# Patient Record
Sex: FEMALE | Race: WHITE | URBAN - METROPOLITAN AREA
[De-identification: names, ages, dates, MRNs, and addresses within clinical notes are randomized per-mention and may not be internally consistent; named-entity substitution may affect disease eponyms.]

---

## 2017-12-14 ENCOUNTER — OFFICE VISIT (OUTPATIENT)
Dept: INTERNAL MEDICINE | Facility: CLINIC | Age: 22
End: 2017-12-14
Payer: COMMERCIAL

## 2017-12-14 VITALS
HEIGHT: 70 IN | WEIGHT: 253.4 LBS | TEMPERATURE: 99.3 F | DIASTOLIC BLOOD PRESSURE: 60 MMHG | OXYGEN SATURATION: 98 % | BODY MASS INDEX: 36.28 KG/M2 | SYSTOLIC BLOOD PRESSURE: 118 MMHG | HEART RATE: 71 BPM

## 2017-12-14 DIAGNOSIS — Z30.41 ENCOUNTER FOR BIRTH CONTROL PILLS MAINTENANCE: ICD-10-CM

## 2017-12-14 DIAGNOSIS — Z11.3 SCREENING EXAMINATION FOR VENEREAL DISEASE: ICD-10-CM

## 2017-12-14 DIAGNOSIS — Z00.01 ENCOUNTER FOR ROUTINE ADULT MEDICAL EXAM WITH ABNORMAL FINDINGS: Primary | ICD-10-CM

## 2017-12-14 DIAGNOSIS — F41.8 DEPRESSION WITH ANXIETY: ICD-10-CM

## 2017-12-14 PROCEDURE — 99385 PREV VISIT NEW AGE 18-39: CPT | Performed by: INTERNAL MEDICINE

## 2017-12-14 PROCEDURE — 99213 OFFICE O/P EST LOW 20 MIN: CPT | Mod: 25 | Performed by: INTERNAL MEDICINE

## 2017-12-14 PROCEDURE — 87491 CHLMYD TRACH DNA AMP PROBE: CPT | Performed by: INTERNAL MEDICINE

## 2017-12-14 PROCEDURE — 87591 N.GONORRHOEAE DNA AMP PROB: CPT | Performed by: INTERNAL MEDICINE

## 2017-12-14 RX ORDER — BUPROPION HYDROCHLORIDE 150 MG/1
150 TABLET ORAL DAILY
Qty: 90 TABLET | Refills: 0 | Status: SHIPPED | OUTPATIENT
Start: 2017-12-14 | End: 2019-03-25

## 2017-12-14 RX ORDER — ESCITALOPRAM OXALATE 20 MG/1
20 TABLET ORAL DAILY
Qty: 90 TABLET | Refills: 0 | Status: SHIPPED | OUTPATIENT
Start: 2017-12-14 | End: 2019-03-25

## 2017-12-14 RX ORDER — ESCITALOPRAM OXALATE 10 MG/1
10 TABLET ORAL DAILY
COMMUNITY
Start: 2017-12-02 | End: 2017-12-14

## 2017-12-14 RX ORDER — DROSPIRENONE AND ETHINYL ESTRADIOL TABLETS 0.02-3(28)
1 KIT ORAL DAILY
Qty: 84 TABLET | Refills: 3 | Status: SHIPPED | OUTPATIENT
Start: 2017-12-14 | End: 2019-03-25

## 2017-12-14 RX ORDER — BUPROPION HYDROCHLORIDE 150 MG/1
150 TABLET ORAL DAILY
COMMUNITY
Start: 2017-12-02 | End: 2017-12-14

## 2017-12-14 RX ORDER — DROSPIRENONE AND ETHINYL ESTRADIOL TABLETS 0.02-3(28)
1 KIT ORAL DAILY
Refills: 0 | COMMUNITY
Start: 2017-10-30 | End: 2017-12-14

## 2017-12-14 ASSESSMENT — PATIENT HEALTH QUESTIONNAIRE - PHQ9
SUM OF ALL RESPONSES TO PHQ QUESTIONS 1-9: 13
5. POOR APPETITE OR OVEREATING: SEVERAL DAYS

## 2017-12-14 ASSESSMENT — ANXIETY QUESTIONNAIRES
2. NOT BEING ABLE TO STOP OR CONTROL WORRYING: SEVERAL DAYS
1. FEELING NERVOUS, ANXIOUS, OR ON EDGE: MORE THAN HALF THE DAYS
3. WORRYING TOO MUCH ABOUT DIFFERENT THINGS: MORE THAN HALF THE DAYS
6. BECOMING EASILY ANNOYED OR IRRITABLE: NOT AT ALL
5. BEING SO RESTLESS THAT IT IS HARD TO SIT STILL: SEVERAL DAYS

## 2017-12-14 NOTE — PATIENT INSTRUCTIONS
Urine sample today.    ---    Refills of birth control and Wellbutrin sent to pharmacy.    ---    Increase dose of Lexapro to 20mg daily.    ---    Follow-up for mood in ~6 weeks (earlier as needed).

## 2017-12-14 NOTE — PROGRESS NOTES
SUBJECTIVE:                                                      HPI: Tuyet Rodriguez is a pleasant 22 year old female who presents for a physical.    She reports that her depression and anxiety are not well controlled on her current medications.    She is on Lexapro 10mg daily and Wellbutrin XL 150mg daily.     She sees her therapist weekly.    PHQ-9 score 13/17 and MOISÉS-7 score 8/21 indicating continued mild-moderate symptoms.     Patient is open to adjusting medications. Her medications were originally prescribed by a psychiatry PA who has not adjusted them since prescribing them.     She also requests refills of OCP.     ROS:  Constitutional: denies unintentional weight loss or gain; denies fevers, chills, or sweats     Cardiovascular: denies chest pain, palpitations, or edema  Respiratory: denies cough, wheezing, shortness of breath, or dyspnea on exertion  Gastrointestinal: denies nausea, vomiting, constipation, diarrhea, or abdominal pain  Genitourinary: denies urinary frequency, urgency, dysuria, or hematuria  Integumentary: denies rash or pruritus  Musculoskeletal: denies back pain, muscle pain, joint pain, or joint swelling  Neurologic: denies focal weakness, numbness, or tingling  Hematologic/Immunologic: denies history of anemia or blood transfusions  Endocrine: denies heat or cold intolerance; denies polyuria, polydipsia  Psychiatric: see above    Past Medical History:   Diagnosis Date     Anxiety      Depression      Obesity (BMI 30-39.9)      Past Surgical History:   Procedure Laterality Date     TONSILLECTOMY & ADENOIDECTOMY  2003     Family History   Problem Relation Age of Onset     Hypertension Mother      Breast Cancer Mother      post-menopausal     Hypertension Father      Hyperlipidemia Father      Type 2 Diabetes Maternal Aunt      Hypertension Maternal Aunt      Myocardial Infarction Paternal Uncle      s/p CABG; later in life     CEREBROVASCULAR DISEASE No family hx of      Coronary  "Artery Disease Early Onset No family hx of      Ovarian Cancer No family hx of      Colon Cancer No family hx of      Occupational History     [employer]           [employer]      [employer]     Social History Main Topics     Smoking status: Never Smoker     Smokeless tobacco: Never Used     Alcohol use Yes      Comment: 2 drinks/month     Drug use: No     Sexual activity: Yes     Partners: Male     Birth control/ protection: Pill     Social History Narrative    Single.    Lives at home with family.    Active jobs; infrequent exercise.      Allergies   Allergen Reactions     Seasonal Allergies      Current Outpatient Prescriptions   Medication Sig     LORYNA 3-0.02 MG per tablet Take 1 tablet by mouth daily     buPROPion (WELLBUTRIN XL) 150 MG 24 hr tablet Take 1 tablet (150 mg) by mouth daily     escitalopram (LEXAPRO) 10 MG tablet Take 1 tablet (10 mg) by mouth daily     Immunization History   Administered Date(s) Administered     HPV Quadrivalent 08/03/2009, 03/06/2010, 08/10/2010     TDAP Vaccine (Adacel) 08/03/2009     Varicella 12/26/2007     OBJECTIVE:                                                      /60 (BP Location: Left arm, Patient Position: Chair, Cuff Size: Adult Large)  Pulse 71  Temp 99.3  F (37.4  C) (Oral)  Ht 5' 10\" (1.778 m)  Wt 253 lb 6.4 oz (114.9 kg)  LMP 12/14/2017 (Exact Date)  SpO2 98%  BMI 36.36 kg/m2  Constitutional: well-appearing  Eyes: normal conjunctivae and lids; pupils equal, round, and reactive to light  Ears, Nose, Mouth, and Throat: normal ears and nose; tympanic membranes visualized and normal; normal lips, teeth, and gums; no oropharyngeal lesions or ulcers  Neck: supple and symmetric; no lymphadenopathy; no thyromegaly or masses  Respiratory: normal respiratory effort; clear to auscultation bilaterally  Cardiovascular: regular rate and rhythm; pedal pulses palpable; no edema  Gastrointestinal: soft, non-tender, " non-distended, and bowel sounds present; no organomegaly or masses  Musculoskeletal: normal gait and station  Psych: normal judgment and insight; normal mood and affect; recent and remote memory intact; oriented to time, place, and person    PREVENTATIVE HEALTH                                                      Blood pressure: within normal limits   Breast CA screening: not medically indicated at this time   Cervical CA screening: not medically indicated at this time   Colon CA screening: not medically indicated at this time   Lung CA screening: n/a   Dexa: not medically indicated at this time   Screening HCV: n/a   Screening cholesterol: not medically indicated at this time   Screening diabetes: not medically indicated at this time   STD testing: screening for gonorrhea and chlamydia DUE  Alcohol misuse screening: alcohol use reviewed - no intervention indicated at this time  Immunizations: reviewed; flu shot DUE - patient declines    ASSESSMENT/PLAN:                                                       (Z00.01) Encounter for routine adult medical exam with abnormal findings  (primary encounter diagnosis)  Comment: PMH, PSH, FH, SH, medications, allergies, immunizations, and preventative health measures reviewed.   Plan: see below for plans.     (Z11.3) Screening examination for venereal disease  Comment: asymptomatic.   Plan: urine for GC/C today.     (Z30.41) Encounter for birth control pills maintenance  Plan: OCP refilled.     (F41.8) Depression with anxiety  Comment: suboptimally controlled on current regimen.   Plan:    - CONTINUE Wellbutrin XL 150mg daily.   - INCREASE Lexapro to 20mg daily.   - follow-up in ~6 weeks (earlier as needed).   - can always consider increasing Wellbutrin as well.    The instructions on the AVS were discussed and explained to the patient. Patient expressed understanding of instructions.    (Chart documentation was completed, in part, with [employer] voice-recognition software. Even  though reviewed, some grammatical, spelling, and word errors may remain.)    Adilene Daly MD   [address] [address]  T: [phone], F: [phone]

## 2017-12-14 NOTE — NURSING NOTE
"Chief Complaint   Patient presents with     Refill Request     Physical       Initial /60 (BP Location: Left arm, Patient Position: Chair, Cuff Size: Adult Large)  Pulse 71  Temp 99.3  F (37.4  C) (Oral)  Ht 5' 10\" (1.778 m)  Wt 253 lb 6.4 oz (114.9 kg)  LMP 12/14/2017 (Exact Date)  SpO2 98%  BMI 36.36 kg/m2 Estimated body mass index is 36.36 kg/(m^2) as calculated from the following:    Height as of this encounter: 5' 10\" (1.778 m).    Weight as of this encounter: 253 lb 6.4 oz (114.9 kg).  Medication Reconciliation: complete     Kaminibose MA      "

## 2017-12-14 NOTE — MR AVS SNAPSHOT
"              After Visit Summary   12/14/2017    Tuyet Rodriguez    MRN: [id number]           Patient Information     Date Of Birth          1995        Visit Information        Provider Department      12/14/2017 1:00 PM Adilene Daly MD [facility]        Today's Diagnoses     Screening examination for venereal disease    -  1    Encounter for birth control pills maintenance        Depression with anxiety          Care Instructions    Urine sample today.    ---    Refills of birth control and Wellbutrin sent to pharmacy.    ---    Increase dose of Lexapro to 20mg daily.    ---    Follow-up for mood in ~6 weeks (earlier as needed).           Follow-ups after your visit        Who to contact     If you have questions or need follow up information about today's clinic visit or your schedule please contact [facility] directly at [phone].  Normal or non-critical lab and imaging results will be communicated to you by MyChart, letter or phone within 4 business days after the clinic has received the results. If you do not hear from us within 7 days, please contact the clinic through MyChart or phone. If you have a critical or abnormal lab result, we will notify you by phone as soon as possible.  Submit refill requests through [email or web address] or call your pharmacy and they will forward the refill request to us. Please allow 3 business days for your refill to be completed.          Additional Information About Your Visit        MyChart Information     [email or web address] lets you send messages to your doctor, view your test results, renew your prescriptions, schedule appointments and more. To sign up, go to [email or web address] . Click on \"Log in\" on the left side of the screen, which will take you to the Welcome page. Then click on \"Sign up Now\" on the right side of the page.     You will be asked to enter the access code listed below, as well as some personal information. " "Please follow the directions to create your username and password.     Your access code is: [email or web address]  Expires: 2018  3:37 PM     Your access code will  in 90 days. If you need help or a new code, please call your [place] clinic or [phone].        Care EveryWhere ID     This is your Care EveryWhere ID. This could be used by other organizations to access your [place] medical records  [id number]        Your Vitals Were     Pulse Temperature Height Last Period Pulse Oximetry BMI (Body Mass Index)    71 99.3  F (37.4  C) (Oral) 5' 10\" (1.778 m) 2017 (Exact Date) 98% 36.36 kg/m2       Blood Pressure from Last 3 Encounters:   17 118/60    Weight from Last 3 Encounters:   17 253 lb 6.4 oz (114.9 kg)              We Performed the Following     CHLAMYDIA TRACHOMATIS PCR     NEISSERIA GONORRHOEA PCR          Today's Medication Changes          These changes are accurate as of: 17  1:41 PM.  If you have any questions, ask your nurse or doctor.               Start taking these medicines.        Dose/Directions    escitalopram 20 MG tablet   Commonly known as:  LEXAPRO   Used for:  Depression with anxiety   Started by:  Adilene Daly MD        Dose:  20 mg   Take 1 tablet (20 mg) by mouth daily   Quantity:  90 tablet   Refills:  0            Where to get your medicines      These medications were sent to [facility] [address] [facility] - [place], [place] - [address]  [address] [facility] [address]     Phone:  [phone]     buPROPion 150 MG 24 hr tablet    escitalopram 20 MG tablet    LORYNA 3-0.02 MG per tablet                Primary Care Provider Office Phone # Fax #    Adilene Daly [phone]       [address] [place] [address]        Equal Access to Services     [facility] NIURKA : Dustin lui Soarnaldo, waaxda luqadaha, qaybta kaalmada ademirzayabradley, coleen cobb. [facility] [phone].    ATENCIÓN: Si fatmata anderson gastelum " disposición servicios gratuitos de asistencia lingüística. Milad olivares [phone].    We comply with applicable federal civil rights laws and [place] laws. We do not discriminate on the basis of race, color, national origin, age, disability, sex, sexual orientation, or gender identity.            Thank you!     Thank you for choosing [facility]  for your care. Our goal is always to provide you with excellent care. Hearing back from our patients is one way we can continue to improve our services. Please take a few minutes to complete the written survey that you may receive in the mail after your visit with us. Thank you!             Your Updated Medication List - Protect others around you: Learn how to safely use, store and throw away your medicines at www.disposemymeds.org.          This list is accurate as of: 12/14/17  1:41 PM.  Always use your most recent med list.                   Brand Name Dispense Instructions for use Diagnosis    buPROPion 150 MG 24 hr tablet    WELLBUTRIN XL    90 tablet    Take 1 tablet (150 mg) by mouth daily    Depression with anxiety       escitalopram 20 MG tablet    LEXAPRO    90 tablet    Take 1 tablet (20 mg) by mouth daily    Depression with anxiety       LORYNA 3-0.02 MG per tablet   Generic drug:  drospirenone-ethinyl estradiol     84 tablet    Take 1 tablet by mouth daily    Encounter for birth control pills maintenance

## 2017-12-14 NOTE — LETTER
12/15/17      Tuyet Marissa Rodriguez  [address]  [facility] [address]        Dear MsPaulMichael,    It was a pleasure meeting you the other day! I hope this finds you well.    I wanted to let you know that your urine screens for gonorrhea and chlamydia came back as negative.    Please feel free to contact me with any questions or concerns.      Take care,    Adilene Daly MD   [address] [address]  T: [phone], F: [phone]

## 2017-12-15 LAB
C TRACH DNA SPEC QL NAA+PROBE: NEGATIVE
N GONORRHOEA DNA SPEC QL NAA+PROBE: NEGATIVE
SPECIMEN SOURCE: NORMAL
SPECIMEN SOURCE: NORMAL

## 2018-01-07 ENCOUNTER — HEALTH MAINTENANCE LETTER (OUTPATIENT)
Age: 23
End: 2018-01-07

## 2018-01-11 ENCOUNTER — HOSPITAL ENCOUNTER (EMERGENCY)
Facility: CLINIC | Age: 23
Discharge: HOME | End: 2018-01-11
Attending: EMERGENCY MEDICINE | Admitting: EMERGENCY MEDICINE
Payer: COMMERCIAL

## 2018-01-11 ENCOUNTER — APPOINTMENT (OUTPATIENT)
Dept: GENERAL RADIOLOGY | Facility: CLINIC | Age: 23
End: 2018-01-11
Attending: EMERGENCY MEDICINE
Payer: COMMERCIAL

## 2018-01-11 ENCOUNTER — NURSE TRIAGE (OUTPATIENT)
Dept: NURSING | Facility: CLINIC | Age: 23
End: 2018-01-11

## 2018-01-11 VITALS
WEIGHT: 230 LBS | RESPIRATION RATE: 16 BRPM | DIASTOLIC BLOOD PRESSURE: 62 MMHG | TEMPERATURE: 98.7 F | OXYGEN SATURATION: 100 % | BODY MASS INDEX: 34.07 KG/M2 | HEART RATE: 83 BPM | HEIGHT: 69 IN | SYSTOLIC BLOOD PRESSURE: 142 MMHG

## 2018-01-11 DIAGNOSIS — S16.1XXA STRAIN OF NECK MUSCLE, INITIAL ENCOUNTER: ICD-10-CM

## 2018-01-11 DIAGNOSIS — F41.9 ANXIETY: ICD-10-CM

## 2018-01-11 DIAGNOSIS — S30.1XXA CONTUSION OF ABDOMINAL WALL, INITIAL ENCOUNTER: ICD-10-CM

## 2018-01-11 DIAGNOSIS — S20.219A CONTUSION OF CHEST WALL, UNSPECIFIED LATERALITY, INITIAL ENCOUNTER: ICD-10-CM

## 2018-01-11 PROCEDURE — 99283 EMERGENCY DEPT VISIT LOW MDM: CPT | Mod: 25

## 2018-01-11 PROCEDURE — 25000132 ZZH RX MED GY IP 250 OP 250 PS 637: Performed by: EMERGENCY MEDICINE

## 2018-01-11 PROCEDURE — 71046 X-RAY EXAM CHEST 2 VIEWS: CPT

## 2018-01-11 RX ORDER — IBUPROFEN 600 MG/1
600 TABLET, FILM COATED ORAL ONCE
Status: COMPLETED | OUTPATIENT
Start: 2018-01-11 | End: 2018-01-11

## 2018-01-11 RX ORDER — IBUPROFEN 600 MG/1
600 TABLET, FILM COATED ORAL EVERY 6 HOURS PRN
Qty: 30 TABLET | Refills: 0 | Status: SHIPPED | OUTPATIENT
Start: 2018-01-11 | End: 2019-03-25

## 2018-01-11 RX ADMIN — IBUPROFEN 600 MG: 600 TABLET ORAL at 18:08

## 2018-01-11 ASSESSMENT — ENCOUNTER SYMPTOMS
NECK PAIN: 1
ABDOMINAL PAIN: 1
NERVOUS/ANXIOUS: 1
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
## 2018-01-11 NOTE — LETTER
January 11, 2018      To Whom It May Concern:      Tuyet Rodriguez was seen in our Emergency Department today, 01/11/18.  I expect her condition to improve over the next 3-5 days.  She may return to work/school when improved.    Sincerely,        Nasreen Villegas MD
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
## 2018-01-11 NOTE — ED AVS SNAPSHOT
Emergency Department    [address] [place] [address]    Phone:  [phone]    Fax:  [phone]                                       Tuyet Rodriguez   MRN: [id number]    Department:   Emergency Department   Date of Visit:  1/11/2018           After Visit Summary Signature Page     I have received my discharge instructions, and my questions have been answered. I have discussed any challenges I see with this plan with the nurse or doctor.    ..........................................................................................................................................  Patient/Patient Representative Signature      ..........................................................................................................................................  Patient Representative Print Name and Relationship to Patient    ..................................................               ................................................  Date                                            Time    ..........................................................................................................................................  Reviewed by Signature/Title    ...................................................              ..............................................  Date                                                            Time
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
## 2019-03-25 ENCOUNTER — OFFICE VISIT (OUTPATIENT)
Dept: FAMILY MEDICINE | Facility: CLINIC | Age: 24
End: 2019-03-25
Payer: COMMERCIAL

## 2019-03-25 VITALS
TEMPERATURE: 98.2 F | RESPIRATION RATE: 16 BRPM | SYSTOLIC BLOOD PRESSURE: 112 MMHG | HEIGHT: 70 IN | HEART RATE: 72 BPM | DIASTOLIC BLOOD PRESSURE: 60 MMHG | BODY MASS INDEX: 37.94 KG/M2 | WEIGHT: 265 LBS

## 2019-03-25 DIAGNOSIS — Z00.01 ENCOUNTER FOR ROUTINE ADULT HEALTH EXAMINATION WITH ABNORMAL FINDINGS: Primary | ICD-10-CM

## 2019-03-25 DIAGNOSIS — F41.1 GAD (GENERALIZED ANXIETY DISORDER): ICD-10-CM

## 2019-03-25 DIAGNOSIS — F32.A DEPRESSION, UNSPECIFIED DEPRESSION TYPE: ICD-10-CM

## 2019-03-25 DIAGNOSIS — Z30.41 ENCOUNTER FOR BIRTH CONTROL PILLS MAINTENANCE: ICD-10-CM

## 2019-03-25 DIAGNOSIS — Z11.3 SCREEN FOR STD (SEXUALLY TRANSMITTED DISEASE): ICD-10-CM

## 2019-03-25 PROCEDURE — 87491 CHLMYD TRACH DNA AMP PROBE: CPT | Performed by: FAMILY MEDICINE

## 2019-03-25 PROCEDURE — 87591 N.GONORRHOEAE DNA AMP PROB: CPT | Performed by: FAMILY MEDICINE

## 2019-03-25 PROCEDURE — 99395 PREV VISIT EST AGE 18-39: CPT | Performed by: FAMILY MEDICINE

## 2019-03-25 RX ORDER — HYDROXYZINE HYDROCHLORIDE 25 MG/1
25 TABLET, FILM COATED ORAL EVERY 8 HOURS PRN
Qty: 60 TABLET | Refills: 1 | Status: SHIPPED | OUTPATIENT
Start: 2019-03-25 | End: 2019-07-25

## 2019-03-25 RX ORDER — PREGABALIN 100 MG/1
100 CAPSULE ORAL 2 TIMES DAILY
Qty: 60 CAPSULE | Refills: 1 | Status: SHIPPED | OUTPATIENT
Start: 2019-03-25 | End: 2019-03-25

## 2019-03-25 RX ORDER — DROSPIRENONE AND ETHINYL ESTRADIOL TABLETS 0.02-3(28)
1 KIT ORAL DAILY
Qty: 84 TABLET | Refills: 1 | Status: SHIPPED | OUTPATIENT
Start: 2019-03-25 | End: 2019-07-25

## 2019-03-25 ASSESSMENT — PATIENT HEALTH QUESTIONNAIRE - PHQ9
SUM OF ALL RESPONSES TO PHQ QUESTIONS 1-9: 17
SUM OF ALL RESPONSES TO PHQ QUESTIONS 1-9: 17
10. IF YOU CHECKED OFF ANY PROBLEMS, HOW DIFFICULT HAVE THESE PROBLEMS MADE IT FOR YOU TO DO YOUR WORK, TAKE CARE OF THINGS AT HOME, OR GET ALONG WITH OTHER PEOPLE: EXTREMELY DIFFICULT

## 2019-03-25 ASSESSMENT — MIFFLIN-ST. JEOR: SCORE: 2039.34
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
## 2019-03-25 NOTE — LETTER
March 27, 2019      Tuyet Marissa Rodriguez  [address] [facility] [address]        Dear ,    I am happy to inform you that your yearly STD screening tests for Gonorrhea and Chalmydia were NEGATIVE (normal).    Resulted Orders   Neisseria gonorrhoeae PCR   Result Value Ref Range    Specimen Descrip Urine     N Gonorrhea PCR Negative NEG^Negative      Comment:      Negative for N. gonorrhoeae rRNA by transcription mediated amplification.  A negative result by transcription mediated amplification does not preclude   the presence of N. gonorrhoeae infection because results are dependent on   proper and adequate collection, absence of inhibitors, and sufficient rRNA to   be detected.     Chlamydia trachomatis PCR   Result Value Ref Range    Specimen Description Urine     Chlamydia Trachomatis PCR Negative NEG^Negative      Comment:      Negative for C. trachomatis rRNA by transcription mediated amplification.  A negative result by transcription mediated amplification does not preclude   the presence of C. trachomatis infection because results are dependent on   proper and adequate collection, absence of inhibitors, and sufficient rRNA to   be detected.       If you have any questions or concerns, please call the clinic at the number listed above.     Sincerely,      Luz Esposito, DO

## 2019-03-26 ENCOUNTER — PATIENT OUTREACH (OUTPATIENT)
Dept: CARE COORDINATION | Facility: CLINIC | Age: 24
End: 2019-03-26

## 2019-03-26 ASSESSMENT — PATIENT HEALTH QUESTIONNAIRE - PHQ9: SUM OF ALL RESPONSES TO PHQ QUESTIONS 1-9: 17

## 2019-03-26 NOTE — PROGRESS NOTES
Clinic Care Coordination Contact  [facility]/Voicemail       Clinical Data: Care Coordinator Outreach  Outreach attempted x 2.  Left message on voiceMyFrontSteps with call back information and requested return call. Please see contact log for detailed outreach attempt dates.  Plan: Care Coordinator mailed out care coordination introduction letter on 3/26/19. No further outreaches will be made at this time unless a new referral is made or a change in the pt's status occurs. Patient was provided with this writer's contact information and encouraged to call with any questions or concerns.  NICKI Casey   Social Work Care Coordinator  [phone]

## 2019-03-26 NOTE — LETTER
[place] CARE COORDINATION    March 26, 2019    Tuyet Rodriguez  [address]  [facility] [id number]      Dear Tuyet,    I am a clinic care coordinator who works at [facility] [employer]. I recently tried to call and was unable to reach you. I wanted to introduce myself and provide you with my contact information so that you can call me with questions or concerns about your health care. Included is a flyer with more information about clinic care coordination and how I can further assist you.       Please feel free to contact me at [phone], with any questions or concerns. We at [facility] are focused on providing you with the highest-quality healthcare experience possible and that all starts with you.     Sincerely,     Niru Farrell
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
## 2019-07-25 ENCOUNTER — OFFICE VISIT (OUTPATIENT)
Dept: PSYCHIATRY | Facility: CLINIC | Age: 24
End: 2019-07-25
Attending: FAMILY MEDICINE
Payer: COMMERCIAL

## 2019-07-25 ENCOUNTER — MYC REFILL (OUTPATIENT)
Dept: FAMILY MEDICINE | Facility: CLINIC | Age: 24
End: 2019-07-25

## 2019-07-25 VITALS
HEIGHT: 70 IN | TEMPERATURE: 99.1 F | WEIGHT: 265 LBS | BODY MASS INDEX: 37.94 KG/M2 | RESPIRATION RATE: 16 BRPM | DIASTOLIC BLOOD PRESSURE: 80 MMHG | OXYGEN SATURATION: 98 % | HEART RATE: 77 BPM | SYSTOLIC BLOOD PRESSURE: 110 MMHG

## 2019-07-25 DIAGNOSIS — Z30.41 ENCOUNTER FOR BIRTH CONTROL PILLS MAINTENANCE: ICD-10-CM

## 2019-07-25 DIAGNOSIS — F41.1 GAD (GENERALIZED ANXIETY DISORDER): Primary | ICD-10-CM

## 2019-07-25 DIAGNOSIS — F43.10 PTSD (POST-TRAUMATIC STRESS DISORDER): ICD-10-CM

## 2019-07-25 DIAGNOSIS — F33.2 SEVERE EPISODE OF RECURRENT MAJOR DEPRESSIVE DISORDER, WITHOUT PSYCHOTIC FEATURES (H): ICD-10-CM

## 2019-07-25 PROCEDURE — 90792 PSYCH DIAG EVAL W/MED SRVCS: CPT | Performed by: NURSE PRACTITIONER

## 2019-07-25 RX ORDER — VENLAFAXINE HYDROCHLORIDE 75 MG/1
75 CAPSULE, EXTENDED RELEASE ORAL DAILY
Qty: 30 CAPSULE | Refills: 0 | Status: SHIPPED | OUTPATIENT
Start: 2019-08-01 | End: 2019-08-29

## 2019-07-25 RX ORDER — VENLAFAXINE HYDROCHLORIDE 37.5 MG/1
37.5 CAPSULE, EXTENDED RELEASE ORAL DAILY
Qty: 7 CAPSULE | Refills: 0 | Status: SHIPPED | OUTPATIENT
Start: 2019-07-25 | End: 2019-08-29

## 2019-07-25 RX ORDER — GABAPENTIN 300 MG/1
300 CAPSULE ORAL 3 TIMES DAILY
Qty: 90 CAPSULE | Refills: 0 | Status: SHIPPED | OUTPATIENT
Start: 2019-07-25 | End: 2019-08-29

## 2019-07-25 ASSESSMENT — ANXIETY QUESTIONNAIRES
4. TROUBLE RELAXING: MORE THAN HALF THE DAYS
3. WORRYING TOO MUCH ABOUT DIFFERENT THINGS: NEARLY EVERY DAY
7. FEELING AFRAID AS IF SOMETHING AWFUL MIGHT HAPPEN: MORE THAN HALF THE DAYS
GAD7 TOTAL SCORE: 15
2. NOT BEING ABLE TO STOP OR CONTROL WORRYING: NEARLY EVERY DAY
5. BEING SO RESTLESS THAT IT IS HARD TO SIT STILL: SEVERAL DAYS
GAD7 TOTAL SCORE: 15
6. BECOMING EASILY ANNOYED OR IRRITABLE: SEVERAL DAYS
GAD7 TOTAL SCORE: 15
7. FEELING AFRAID AS IF SOMETHING AWFUL MIGHT HAPPEN: MORE THAN HALF THE DAYS
1. FEELING NERVOUS, ANXIOUS, OR ON EDGE: NEARLY EVERY DAY

## 2019-07-25 ASSESSMENT — PATIENT HEALTH QUESTIONNAIRE - PHQ9
SUM OF ALL RESPONSES TO PHQ QUESTIONS 1-9: 19
10. IF YOU CHECKED OFF ANY PROBLEMS, HOW DIFFICULT HAVE THESE PROBLEMS MADE IT FOR YOU TO DO YOUR WORK, TAKE CARE OF THINGS AT HOME, OR GET ALONG WITH OTHER PEOPLE: SOMEWHAT DIFFICULT
SUM OF ALL RESPONSES TO PHQ QUESTIONS 1-9: 19

## 2019-07-25 ASSESSMENT — MIFFLIN-ST. JEOR: SCORE: 2039.34

## 2019-07-25 ASSESSMENT — PAIN SCALES - GENERAL: PAINLEVEL: NO PAIN (0)

## 2019-07-25 NOTE — NURSING NOTE
"Chief Complaint   Patient presents with     Consult     referred by Luz Esposito- severe anxiety and depression, current medication not helping.      initial /80 (BP Location: Right arm, Patient Position: Chair, Cuff Size: Adult Large)   Pulse 77   Temp 99.1  F (37.3  C) (Oral)   Resp 16   Ht 1.765 m (5' 9.5\")   Wt 120.2 kg (265 lb)   LMP 06/20/2019 (Approximate)   SpO2 98%   BMI 38.57 kg/m   Estimated body mass index is 38.57 kg/m  as calculated from the following:    Height as of this encounter: 1.765 m (5' 9.5\").    Weight as of this encounter: 120.2 kg (265 lb)..  bp completed using cuff size large    "

## 2019-07-25 NOTE — Clinical Note
Hi Dr Esposito,Thank you for the Psychiatry referral to the [facility] Psychiatry Service ([facility]). Our psychiatry providers act as a specialty service for Primary Care Providers in the [facility] that seek to optimize medications for unstable patients.  Once medications have been optimized, our providers discharge the patient back to the referring Primary Care Provider for ongoing medication management.  This type of system allows our providers to serve a high volume of patients. Please see my Impression and Plan. If you have any questions or concerns, please let me know. Vonnie

## 2019-07-25 NOTE — PATIENT INSTRUCTIONS
Treatment Plan:    Discontinue Vistaril/Atarax (hydroxyzine). Stopped by patient.     Start Effexor (venlafaxine) XR 37.5 mg by mouth daily for one week, then increase to 75 mg by mouth daily for anxiety and depression.     Start Neurontin (gabapentin) 300 mg by mouth up to 3 times per day as needed for anxiety and sleep.     Continue all other medications as reviewed per electronic medical record today.     Safety plan reviewed. To the Emergency Department as needed or call after hours crisis line at [phone] or [phone]. [place] Crisis Text Line: Text [place] to 427779  or  Suicide LifeLine Chat: suicidepreventionlifeline.org/chat    To schedule individual or family therapy, call [facility] at [phone].     Schedule an appointment with me in 2-4 weeks or sooner as needed.      Follow up with primary care provider as planned or sooner for acute medical concerns.    Call the psychiatric nurse line with medication questions or concerns at [phone].    MyChart may be used to communicate with your provider, but this is not intended to be used for emergencies.

## 2019-07-26 ASSESSMENT — PATIENT HEALTH QUESTIONNAIRE - PHQ9: SUM OF ALL RESPONSES TO PHQ QUESTIONS 1-9: 19

## 2019-07-26 ASSESSMENT — ANXIETY QUESTIONNAIRES: GAD7 TOTAL SCORE: 15

## 2019-07-29 RX ORDER — DROSPIRENONE AND ETHINYL ESTRADIOL TABLETS 0.02-3(28)
1 KIT ORAL DAILY
Qty: 84 TABLET | Refills: 1 | Status: SHIPPED | OUTPATIENT
Start: 2019-07-29 | End: 2019-08-31

## 2019-07-29 NOTE — TELEPHONE ENCOUNTER
"Last OV 03/25/2019.    Requested Prescriptions   Pending Prescriptions Disp Refills     LORYNA 3-0.02 MG tablet 84 tablet 1     Sig: Take 1 tablet by mouth daily       Contraceptives Protocol Passed - 7/25/2019  2:52 PM        Passed - Patient is not a current smoker if age is 35 or older        Passed - Recent (12 mo) or future (30 days) visit within the authorizing provider's specialty     Patient had office visit in the last 12 months or has a visit in the next 30 days with authorizing provider or within the authorizing provider's specialty.  See \"Patient Info\" tab in inbasket, or \"Choose Columns\" in Meds & Orders section of the refill encounter.              Passed - Medication is active on med list        Passed - No active pregnancy on record        Passed - No positive pregnancy test in past 12 months        Prescription approved per [facility] Refill Protocol.    "

## 2019-08-29 ENCOUNTER — OFFICE VISIT (OUTPATIENT)
Dept: PSYCHIATRY | Facility: CLINIC | Age: 24
End: 2019-08-29
Payer: COMMERCIAL

## 2019-08-29 VITALS
DIASTOLIC BLOOD PRESSURE: 80 MMHG | TEMPERATURE: 98.9 F | OXYGEN SATURATION: 99 % | RESPIRATION RATE: 16 BRPM | WEIGHT: 268 LBS | HEART RATE: 98 BPM | HEIGHT: 69 IN | SYSTOLIC BLOOD PRESSURE: 102 MMHG | BODY MASS INDEX: 39.69 KG/M2

## 2019-08-29 DIAGNOSIS — F12.20 MODERATE CANNABIS USE DISORDER (H): ICD-10-CM

## 2019-08-29 DIAGNOSIS — F43.10 PTSD (POST-TRAUMATIC STRESS DISORDER): ICD-10-CM

## 2019-08-29 DIAGNOSIS — F33.2 SEVERE EPISODE OF RECURRENT MAJOR DEPRESSIVE DISORDER, WITHOUT PSYCHOTIC FEATURES (H): Primary | ICD-10-CM

## 2019-08-29 DIAGNOSIS — F41.1 GAD (GENERALIZED ANXIETY DISORDER): ICD-10-CM

## 2019-08-29 PROCEDURE — 99214 OFFICE O/P EST MOD 30 MIN: CPT | Performed by: NURSE PRACTITIONER

## 2019-08-29 RX ORDER — VENLAFAXINE HYDROCHLORIDE 150 MG/1
150 CAPSULE, EXTENDED RELEASE ORAL DAILY
Qty: 30 CAPSULE | Refills: 1 | Status: SHIPPED | OUTPATIENT
Start: 2019-08-29

## 2019-08-29 RX ORDER — PROPRANOLOL HYDROCHLORIDE 20 MG/1
20-40 TABLET ORAL 4 TIMES DAILY PRN
Qty: 90 TABLET | Refills: 1 | Status: SHIPPED | OUTPATIENT
Start: 2019-08-29

## 2019-08-29 ASSESSMENT — ANXIETY QUESTIONNAIRES
GAD7 TOTAL SCORE: 16
1. FEELING NERVOUS, ANXIOUS, OR ON EDGE: NEARLY EVERY DAY
GAD7 TOTAL SCORE: 16
2. NOT BEING ABLE TO STOP OR CONTROL WORRYING: NEARLY EVERY DAY
3. WORRYING TOO MUCH ABOUT DIFFERENT THINGS: NEARLY EVERY DAY
4. TROUBLE RELAXING: NEARLY EVERY DAY
6. BECOMING EASILY ANNOYED OR IRRITABLE: NOT AT ALL
7. FEELING AFRAID AS IF SOMETHING AWFUL MIGHT HAPPEN: MORE THAN HALF THE DAYS
7. FEELING AFRAID AS IF SOMETHING AWFUL MIGHT HAPPEN: MORE THAN HALF THE DAYS
5. BEING SO RESTLESS THAT IT IS HARD TO SIT STILL: MORE THAN HALF THE DAYS
GAD7 TOTAL SCORE: 16

## 2019-08-29 ASSESSMENT — PATIENT HEALTH QUESTIONNAIRE - PHQ9
SUM OF ALL RESPONSES TO PHQ QUESTIONS 1-9: 20
10. IF YOU CHECKED OFF ANY PROBLEMS, HOW DIFFICULT HAVE THESE PROBLEMS MADE IT FOR YOU TO DO YOUR WORK, TAKE CARE OF THINGS AT HOME, OR GET ALONG WITH OTHER PEOPLE: VERY DIFFICULT
SUM OF ALL RESPONSES TO PHQ QUESTIONS 1-9: 20

## 2019-08-29 ASSESSMENT — MIFFLIN-ST. JEOR: SCORE: 2048.98

## 2019-08-29 NOTE — PATIENT INSTRUCTIONS
Treatment Plan:    Stop Neurontin (gabapentin).     Increase Effexor (venlafaxine) XR to 150 mg by mouth daily for anxiety and depression.     Start Inderal (propranolol) 20 - 40 mg by mouth up to 4 times per day as needed for anxiety and panic.     Continue all other medications as reviewed per electronic medical record today.     Safety plan reviewed. To the Emergency Department as needed or call after hours crisis line at [phone] or [phone]. [place] Crisis Text Line. Text [place] to 411499 or Suicide LifeLine Chat: suicidepreventionlifeline.org/chat    To schedule individual or family therapy, call [facility] at [phone].     Schedule an appointment with me in 4-6 weeks or sooner as needed. Call [facility] at [phone] to schedule.    Follow up with primary care provider as planned or for acute medical concerns.    Call the psychiatric nurse line with medication questions or concerns at [phone].    MyChart may be used to communicate with your provider, but this is not intended to be used for emergencies.

## 2019-08-29 NOTE — PROGRESS NOTES
"    Outpatient Psychiatric Progress Note    Name: Tuyet Rodriguez   : 1995                    Primary Care Provider: Luz Esposito DO - last visit 3/25/2019 to establish care  Therapist: None    PHQ-9 scores:  PHQ-9 SCORE 3/25/2019 2019 2019   PHQ-9 Total Score MyChart 17 (Moderately severe depression) 19 (Moderately severe depression) 20 (Severe depression)       MOISÉS-7 scores:  MOISÉS-7 SCORE 2019   Total Score 15 (severe anxiety) 16 (severe anxiety)     Answers for HPI/ROS submitted by the patient on 2019   If you checked off any problems, how difficult have these problems made it for you to do your work, take care of things at home, or get along with other people?: Very difficult  PHQ9 TOTAL SCORE: 20  MOISÉS 7 TOTAL SCORE: 16    Patient Identification:  Patient is a 23 year old, single  White American female  who presents for return visit with me.  Patient is currently employed full time. Patient attended the session alone. Patient prefers to be called: \"Tuyet\". Consent to communicate signed for Jessica Rodriguez  patient's Mother.    Interim History:    I last saw Tuyet Rodriguez for outpatient psychiatry Consultation on 2019.     During that appointment, we Discontinue Vistaril/Atarax (hydroxyzine). Stopped by patient.     Start Effexor (venlafaxine) XR 37.5 mg by mouth daily for one week, then increase to 75 mg by mouth daily for anxiety and depression.     Start Neurontin (gabapentin) 300 mg by mouth up to 3 times per day as needed for anxiety and sleep.      Current medications include:   Current Outpatient Medications   Medication Sig     gabapentin (NEURONTIN) 300 MG capsule Take 1 capsule (300 mg) by mouth 3 times daily     LORYNA 3-0.02 MG tablet Take 1 tablet by mouth daily     venlafaxine (EFFEXOR-XR) 75 MG 24 hr capsule Take 1 capsule (75 mg) by mouth daily     No current facility-administered medications for this visit.        The [place] Prescription " "Monitoring Program has been reviewed and there are no concerns about diversionary activity for controlled substances at this time.      PRESCRIPTIONS  Total Prescriptions: 1  Total Private Pay: 0  Fill Date ID Written Drug Qty Days Prescriber Rx # Pharmacy Refill Daily Dose * Pymt Type   07/25/2019 1 07/25/2019 Gabapentin 300 Mg Capsule   90.00 30 Am [facility] [id number] Raj ([id number]) 1/1  Comm Ins [place]      I was able to review most recent Primary Care Provider, specialty provider, and therapy visit notes that I have access to.     Tuyet Marissa Rodriguez reports mood has been: \"maybe that is better, but anxiety is too bad\" no hypomania or rusty. No anger or aggression.   Anxiety has been: \"panic attacks everyday\" notes dizziness and nausea and crying and shortness of breath and heart beating fast and hand tremors- last for 30 minutes. This can happen at work and home. Was trying to take the Neurontin (gabapentin) 300 mg 3 times per day and \"thought it wasn't working and needed more... So would take 2- 600 mg- and that did not help... So went back to my Xanax (alprazolam).\" Patient was getting the Xanax (alprazolam) \"from a friend\".   Sleep has been: \"weird dreams\" sometimes these wake her up- usually able to get back to sleep- hard to fall asleep 5 days out of the week. Taking melatonin 20-30 mg and Benadryl (diphenhydramine) 25 mg for sleep.   PHQ9 and GAD7 scores were reviewed today. No improvements.   Medication side effects: Denies - taking Effexor (venlafaxine) in the morning and some days will not take until later- denies withdrawal effects   Current stressors include: Myself, My father- retired , symptoms, financial stressors, relationship difficulties, grief, looking for new job to be able to move out  Coping mechanisms and supports include: Marijuana, Crying, Cats, Dogs, Being alone, Reading and TV, Best Friend    Previous medication trials include but not limited to:  Vistaril/Atarax (hydroxyzine)  Neurontin " "(gabapentin)   Effexor (venlafaxine)     Per Patient report:  Lexapro (escitalopram)  Celexa (citalopram)  Wellbutrin (buproprion)  Zoloft (sertraline)   Benadryl (diphenhydramine) can sometimes help with sleep  Melatonin     Past Medical History:   Diagnosis Date     Anxiety      Depression      Obesity (BMI 30-39.9)       has a past medical history of Anxiety, Depression, and Obesity (BMI 30-39.9).    Social History:  Current Living situation: [place], [place] with Mother and Father and 3 cats and 2 dogs. Feels safe at home, but still stressful.  Current use of drugs or alcohol: Alcohol about 2-3 products per day. Will drink more socially with friends and getting drunk with friends 3-4 days per week.   Marijuana every other day and \"calms me down\" - uses THC pens mostly for anxiety and sleep. Has been using since the age of 19 years old. CBD oil tried and was not very helpful.   Tobacco use: No  Caffeine: Yes 1 sodas/week - makes anxiety worse    Vital Signs:   /80   Pulse 98   Temp 98.9  F (37.2  C) (Oral)   Resp 16   Ht 1.759 m (5' 9.25\")   Wt 121.6 kg (268 lb)   LMP 08/08/2019 (Approximate)   SpO2 99%   BMI 39.29 kg/m      Labs:  Most recent laboratory results reviewed and no new labs.     Review of Systems:  10 systems (general, cardiovascular, respiratory, eyes, ENT, endocrine, GI, , M/S, neurological) were reviewed. Most pertinent finding(s) is/are:  nausea related to anxiety, no pain. The remaining systems are all unremarkable.    Mental Status Examination:  Appearance:  adequately groomed, mild distress, morbidly obese, long brown hair with purple colored ends, multiple facial piercings, multiple visible tattoos on arms and chest, wears makeup, alone, on time  Attitude:  cooperative   Eye Contact:  fair  Gait and Station: Normal, No assistive Devices used and No dizziness or falls  Psychomotor Behavior:  no evidence of tardive dyskinesia, dystonia, or tics  Oriented to:  time, person, and " "place  Attention Span and Concentration:  Normal  Speech:  clear, coherent, regular rate, regular rhythm and fluent  Language: intact  Mood:  \"anxious\"   Affect:  mood congruent  Associations:  no loose associations  Thought Process:  logical, linear and goal oriented  Thought Content:  no evidence of psychotic thought, passive suicidal ideation present, no evidence of homicidal ideation, and Appropriate to Interview  Recent and Remote Memory:  Intact to interview. Feels forgetful at times. Not formally assessed. No amnesia.  Fund of Knowledge: appropriate  Insight:  good  Judgment:  intact, adequate for safety  Impulse Control:  intact     Suicide Risk Assessment:  Today Tuyet Rodriguez reports depression and suicidal ideation with \"thinking I want to die and disappear\". Mood has not changed much and anxiety continues. Thinking about dying every other day- easy to distract self. In addition, there are notable risk factors for self-harm, including age, single status, anxiety, substance abuse, previous history of suicide attempts, recent loss of uncle  and suicidal ideation. However, risk is mitigated by commitment to family, ability to volunteer a safety plan, history of seeking help when needed, future oriented, denies suicidal intent or plan, no family history of suicide and denies homicidal ideation, intent, or plan. \"Not likely I would kill myself because that would kill my mom... There is a fear of what may happen when I die.\" Therefore, based on all available evidence including the factors cited above, Tuyet Rodriguez does not appear to be at imminent risk for self-harm, does not meet criteria for a 72-hr hold, and therefore remains appropriate for ongoing outpatient level of care.  A thorough assessment of risk factors related to suicide and self-harm have been reviewed and are noted above. The patient convincingly denies acute suicidality on several occasions. Local community safety resources reviewed and " printed for patient to use if needed.      DSM5  Diagnosis:  296.33 (F33.2) Major Depressive Disorder, Recurrent Episode, Severe With anxious distress without psychosis  300.01 (F41.0) Panic Disorder without agoraphobia  300.02 (F41.1) Generalized Anxiety Disorder  309.81 (F43.10) Posttraumatic Stress Disorder Without dissociative symptoms  304.30 (F12.20) Cannabis Use Disorder Moderate continuous use               Rule out Personality Disorder  Obesity per BMI of 39    Medical comorbidities include:   Patient Active Problem List    Diagnosis Date Noted     PTSD (post-traumatic stress disorder) 07/25/2019     Priority: Medium     MOISÉS (generalized anxiety disorder)      Priority: Medium     Severe episode of recurrent major depressive disorder, without psychotic features (H)      Priority: Medium     Obesity (BMI 30-39.9)      Priority: Medium       Psychosocial & Contextual Factors:  Parent/Child Relationship Difficulties and Relationship Difficulties    Assessment:  Tuyet Rodriguez reports ongoing insomnia, anxiety and panic, and mood that is generally improving. Medication side effects and alternatives were reviewed. Health promotion activities recommended and reviewed today. All questions addressed. Education and counseling completed regarding risks and benefits of medications and psychotherapy options. Recommend therapy for additional support.     She tried the Neurontin (gabapentin) and had unexpected side effects but is taking Xanax (alprazolam) from a friend and finds this helpful. Patient also notes she has tried her friend's Seroquel (quetiapine) for sleep and this helped in the past.  I will not be prescribing a benzodiazepine medication today for multiple reasons including , Patient is not willing to see therapy at this time, Patient is using Marijuana and has no interest in quitting use. I agree with the reasons provided by the Primary Care Provider during the patient's visit with them a few months ago.   Recommend to reduce Marijuana and alcohol use. Patient still wants to try this and I decline.     Patient has not had updated labs and this could be helpful to rule out medical causes of symptoms. I can coordinate with Primary Care Provider to get these drawn.      DBT may be helpful for more therapy support. Inderal (propranolol) may be another option to use as needed for physical symptoms of anxiety and panic. We will try this.     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 Patient is aware of this and we discussed other options for care if needed. We also reviewed the Collaborative Care Psychiatry Service.     Treatment Plan:    Stop Neurontin (gabapentin).     Increase Effexor (venlafaxine) XR to 150 mg by mouth daily for anxiety and depression.     Start Inderal (propranolol) 20 - 40 mg by mouth up to 4 times per day as needed for anxiety and panic.     Continue all other medications as reviewed per electronic medical record today.     Safety plan reviewed. To the Emergency Department as needed or call after hours crisis line at [phone] or [phone]. [place] Crisis Text Line. Text [place] to 635976 or Suicide LifeLine Chat: suicidepreventionlifeline.org/chat    To schedule individual or family therapy, call [facility] at [phone].     Schedule an appointment with me in 4-6 weeks or sooner as needed. Call [facility] at [phone] to schedule.    Follow up with primary care provider as planned or for acute medical concerns.    Call the psychiatric nurse line with medication questions or concerns at [phone].    [facility] may be used to communicate with your provider, but this is not intended to be used for emergencies.    Administrative Billing:   Time spent with patient was 30 minutes and greater than 50% of time or 20 minutes was spent in counseling and coordination of care regarding above diagnoses  and treatment plan.    Patient Status:  Patient will continue to be seen for ongoing consultation and stabilization.    Signed:   Vonnie Orellana, PhD, APRN, CNP   Psychiatry

## 2019-08-30 ASSESSMENT — ANXIETY QUESTIONNAIRES: GAD7 TOTAL SCORE: 16

## 2019-08-30 ASSESSMENT — PATIENT HEALTH QUESTIONNAIRE - PHQ9: SUM OF ALL RESPONSES TO PHQ QUESTIONS 1-9: 20

## 2019-08-31 ENCOUNTER — MYC REFILL (OUTPATIENT)
Dept: FAMILY MEDICINE | Facility: CLINIC | Age: 24
End: 2019-08-31

## 2019-08-31 ENCOUNTER — MYC MEDICAL ADVICE (OUTPATIENT)
Dept: FAMILY MEDICINE | Facility: CLINIC | Age: 24
End: 2019-08-31

## 2019-08-31 DIAGNOSIS — Z30.41 ENCOUNTER FOR BIRTH CONTROL PILLS MAINTENANCE: ICD-10-CM

## 2019-09-01 ENCOUNTER — MYC REFILL (OUTPATIENT)
Dept: FAMILY MEDICINE | Facility: CLINIC | Age: 24
End: 2019-09-01

## 2019-09-01 DIAGNOSIS — Z30.41 ENCOUNTER FOR BIRTH CONTROL PILLS MAINTENANCE: ICD-10-CM

## 2019-09-03 RX ORDER — DROSPIRENONE AND ETHINYL ESTRADIOL TABLETS 0.02-3(28)
1 KIT ORAL DAILY
Qty: 84 TABLET | Refills: 1 | Status: SHIPPED | OUTPATIENT
Start: 2019-09-03 | End: 2020-04-14

## 2019-09-03 NOTE — TELEPHONE ENCOUNTER
"LORYNA 3-0.02 MG tablet  Last Written Prescription Date: 7/29/19  Last Fill Quantity: 84,  # refills: 1   Last office visit: 3/25/2019 with prescribing provider: Dr. Esposito   Future Office Visit:   Next 5 appointments (look out 90 days)    Oct 17, 2019  3:45 PM CDT  [facility] Vonnie [facility]ana NP  [facility] [address]  [address]  [facility] [address] [phone]         Requested Prescriptions   Pending Prescriptions Disp Refills     LORYNA 3-0.02 MG tablet 84 tablet 1     Sig: Take 1 tablet by mouth daily       Contraceptives Protocol Passed - 8/31/2019  6:47 PM        Passed - Patient is not a current smoker if age is 35 or older        Passed - Recent (12 mo) or future (30 days) visit within the authorizing provider's specialty     Patient had office visit in the last 12 months or has a visit in the next 30 days with authorizing provider or within the authorizing provider's specialty.  See \"Patient Info\" tab in inbasket, or \"Choose Columns\" in Meds & Orders section of the refill encounter.              Passed - Medication is active on med list        Passed - No active pregnancy on record        Passed - No positive pregnancy test in past 12 months        Prescription approved per [facility] Refill Protocol.    "

## 2019-09-04 RX ORDER — DROSPIRENONE AND ETHINYL ESTRADIOL TABLETS 0.02-3(28)
1 KIT ORAL DAILY
Qty: 84 TABLET | Refills: 1 | Status: SHIPPED | OUTPATIENT
Start: 2019-09-04 | End: 2019-11-07

## 2019-09-04 NOTE — TELEPHONE ENCOUNTER
Prescription approved per [facility] Refill Protocol for 12 months of refills since last appointment, which was 3/25/2019.

## 2019-09-07 ENCOUNTER — MYC REFILL (OUTPATIENT)
Dept: FAMILY MEDICINE | Facility: CLINIC | Age: 24
End: 2019-09-07

## 2019-09-07 DIAGNOSIS — Z30.41 ENCOUNTER FOR BIRTH CONTROL PILLS MAINTENANCE: ICD-10-CM

## 2019-09-07 RX ORDER — DROSPIRENONE AND ETHINYL ESTRADIOL TABLETS 0.02-3(28)
1 KIT ORAL DAILY
Qty: 84 TABLET | Refills: 1 | Status: CANCELLED | OUTPATIENT
Start: 2019-09-07

## 2019-09-09 RX ORDER — DROSPIRENONE AND ETHINYL ESTRADIOL TABLETS 0.02-3(28)
1 KIT ORAL DAILY
Qty: 84 TABLET | Refills: 1 | OUTPATIENT
Start: 2019-09-09

## 2019-09-27 ENCOUNTER — OFFICE VISIT (OUTPATIENT)
Dept: URGENT CARE | Facility: URGENT CARE | Age: 24
End: 2019-09-27
Payer: COMMERCIAL

## 2019-09-27 VITALS
HEART RATE: 85 BPM | TEMPERATURE: 99.6 F | OXYGEN SATURATION: 100 % | WEIGHT: 268 LBS | BODY MASS INDEX: 39.29 KG/M2 | RESPIRATION RATE: 16 BRPM | SYSTOLIC BLOOD PRESSURE: 122 MMHG | DIASTOLIC BLOOD PRESSURE: 80 MMHG

## 2019-09-27 DIAGNOSIS — R50.9 FEVER IN ADULT: Primary | ICD-10-CM

## 2019-09-27 DIAGNOSIS — R68.83 CHILLS: ICD-10-CM

## 2019-09-27 DIAGNOSIS — R42 DIZZINESS: ICD-10-CM

## 2019-09-27 LAB
ALBUMIN UR-MCNC: 100 MG/DL
APPEARANCE UR: CLEAR
BACTERIA #/AREA URNS HPF: ABNORMAL /HPF
BASOPHILS # BLD AUTO: 0 10E9/L (ref 0–0.2)
BASOPHILS NFR BLD AUTO: 0.2 %
BILIRUB UR QL STRIP: ABNORMAL
COLOR UR AUTO: YELLOW
DIFFERENTIAL METHOD BLD: NORMAL
EOSINOPHIL # BLD AUTO: 0 10E9/L (ref 0–0.7)
EOSINOPHIL NFR BLD AUTO: 0 %
GLUCOSE UR STRIP-MCNC: NEGATIVE MG/DL
HGB UR QL STRIP: ABNORMAL
KETONES UR STRIP-MCNC: 15 MG/DL
LEUKOCYTE ESTERASE UR QL STRIP: NEGATIVE
LYMPHOCYTES # BLD AUTO: 1.1 10E9/L (ref 0.8–5.3)
LYMPHOCYTES NFR BLD AUTO: 22.5 %
MONOCYTES # BLD AUTO: 0.4 10E9/L (ref 0–1.3)
MONOCYTES NFR BLD AUTO: 7.8 %
MUCOUS THREADS #/AREA URNS LPF: PRESENT /LPF
NEUTROPHILS # BLD AUTO: 3.5 10E9/L (ref 1.6–8.3)
NEUTROPHILS NFR BLD AUTO: 69.5 %
NITRATE UR QL: NEGATIVE
NON-SQ EPI CELLS #/AREA URNS LPF: ABNORMAL /LPF
PH UR STRIP: 5.5 PH (ref 5–7)
RBC #/AREA URNS AUTO: ABNORMAL /HPF
SOURCE: ABNORMAL
SP GR UR STRIP: 1.02 (ref 1–1.03)
UROBILINOGEN UR STRIP-ACNC: 0.2 EU/DL (ref 0.2–1)
WBC # BLD AUTO: 5 10E9/L (ref 4–11)
WBC #/AREA URNS AUTO: ABNORMAL /HPF

## 2019-09-27 PROCEDURE — 85048 AUTOMATED LEUKOCYTE COUNT: CPT | Performed by: FAMILY MEDICINE

## 2019-09-27 PROCEDURE — 36415 COLL VENOUS BLD VENIPUNCTURE: CPT | Performed by: FAMILY MEDICINE

## 2019-09-27 PROCEDURE — 99214 OFFICE O/P EST MOD 30 MIN: CPT | Performed by: FAMILY MEDICINE

## 2019-09-27 PROCEDURE — 85004 AUTOMATED DIFF WBC COUNT: CPT | Performed by: FAMILY MEDICINE

## 2019-09-27 PROCEDURE — 81001 URINALYSIS AUTO W/SCOPE: CPT | Performed by: FAMILY MEDICINE

## 2019-09-27 NOTE — PROGRESS NOTES
Chief Complaint   Patient presents with     Fever     fever and vomitng X 2 days  Pt states some of her peircings seem irritated and hurt     SUBJECTIVE:   Tuyet Rodriguez is a 23 year old female presenting with a chief complaint of fever, chills, headache, body aches, fatigue and vomiting. She is an established patient of [facility].  Onset of symptoms was  day(s) ago.her vomiting has resolved now   Course of illness is worsening.    Severity moderate  Current and Associated symptoms: fever and body aches  Treatment measures tried include Tylenol/Ibuprofen.  Predisposing factors include None.    Past Medical History:   Diagnosis Date     Anxiety      Depression      Obesity (BMI 30-39.9)      Current Outpatient Medications   Medication Sig Dispense Refill     LORYNA 3-0.02 MG tablet Take 1 tablet by mouth daily 84 tablet 1     LORYNA 3-0.02 MG tablet Take 1 tablet by mouth daily 84 tablet 1     propranolol (INDERAL) 20 MG tablet Take 1-2 tablets (20-40 mg) by mouth 4 times daily as needed (anxiety and panic) 90 tablet 1     venlafaxine (EFFEXOR-XR) 150 MG 24 hr capsule Take 1 capsule (150 mg) by mouth daily 30 capsule 1     Social History     Tobacco Use     Smoking status: Never Smoker     Smokeless tobacco: Never Used   Substance Use Topics     Alcohol use: Yes     Comment: 2 times per week, 5 drinks      Family History   Problem Relation Age of Onset     Hypertension Mother      Breast Cancer Mother         post-menopausal     Hypertension Father      Hyperlipidemia Father      Diabetes Type 2  Maternal Aunt      Hypertension Maternal Aunt      Myocardial Infarction Paternal Uncle         s/p CABG; later in life     Cerebrovascular Disease No family hx of      Coronary Artery Disease Early Onset No family hx of      Ovarian Cancer No family hx of      Colon Cancer No family hx of          ROS:    10 point ROS of systems including  Eyes, Respiratory, Cardiovascular, Gastroenterology, Genitourinary, Integumentary,  Muscularskeletal, Psychiatric were all negative except for pertinent positives noted in my HPI         OBJECTIVE:  /80 (BP Location: Left arm, Patient Position: Sitting, Cuff Size: Adult Regular)   Pulse 85   Temp 99.6  F (37.6  C) (Oral)   Resp 16   Wt 121.6 kg (268 lb)   SpO2 100%   BMI 39.29 kg/m    GENERAL APPEARANCE: healthy, alert and no distress  EYES: EOMI,  PERRL, conjunctiva clear  HENT: ear canals and TM's normal.  Nose and mouth without ulcers, erythema or lesions  NECK: supple, nontender, no lymphadenopathy  RESP: lungs clear to auscultation - no rales, rhonchi or wheezes  CV: regular rates and rhythm, normal S1 S2, no murmur noted  ABDOMEN:  soft, nontender, no HSM or masses and bowel sounds normal  SKIN: no suspicious lesions or rashes  Physical Exam      X-Ray was not done.    Medical Decision Making:    Differential Diagnosis:  URI Adult/Peds:  Bronchitis-viral, Influenza, Strep pharyngitis, Tonsilitis, Viral pharyngitis and Viral syndrome,   uti/gastroenteritis      ASSESSMENT:  Tuyet was seen today for fever.    Diagnoses and all orders for this visit:    Fever in adult  -     WBC with Diff  -     *UA reflex to Microscopic and Culture ([facility] (except [facility])    Dizziness    Chills      Results for orders placed or performed in visit on 09/27/19   WBC with Diff   Result Value Ref Range    WBC 5.0 4.0 - 11.0 10e9/L    % Neutrophils 69.5 %    % Lymphocytes 22.5 %    % Monocytes 7.8 %    % Eosinophils 0.0 %    % Basophils 0.2 %    Absolute Neutrophil 3.5 1.6 - 8.3 10e9/L    Absolute Lymphocytes 1.1 0.8 - 5.3 10e9/L    Absolute Monocytes 0.4 0.0 - 1.3 10e9/L    Absolute Eosinophils 0.0 0.0 - 0.7 10e9/L    Absolute Basophils 0.0 0.0 - 0.2 10e9/L    Diff Method Automated Method    *UA reflex to Microscopic and Culture ([facility] (except [place] and [place])   Result Value Ref Range    Color Urine Yellow     Appearance Urine Clear      Glucose Urine Negative NEG^Negative mg/dL    Bilirubin Urine Small (A) NEG^Negative    Ketones Urine 15 (A) NEG^Negative mg/dL    Specific Gravity Urine 1.025 1.003 - 1.035    Blood Urine Large (A) NEG^Negative    pH Urine 5.5 5.0 - 7.0 pH    Protein Albumin Urine 100 (A) NEG^Negative mg/dL    Urobilinogen Urine 0.2 0.2 - 1.0 EU/dL    Nitrite Urine Negative NEG^Negative    Leukocyte Esterase Urine Negative NEG^Negative    Source Midstream Urine    Urine Microscopic   Result Value Ref Range    WBC Urine 0 - 5 OTO5^0 - 5 /HPF    RBC Urine 10-25 (A) OTO2^O - 2 /HPF    Squamous Epithelial /LPF Urine Few FEW^Few /LPF    Bacteria Urine Few (A) NEG^Negative /HPF    Mucous Urine Present (A) NEG^Negative /LPF           PLAN:  Tylenol, Ibuprofen, Fluids and Rest I reviewed with patient that symptoms seem to be related to a viral infection.  I doubt its flu as she does not have very high fever and typical symptoms of influenza.  With due to her chills would consider checking a UA and add white blood cell.  See orders in Epic  I did discuss with patient that the labs are within normal limits except the UA did show some ketones which could be related to her not eating enough.  At this point I would treated like a viral infection but if anything changes with the nature of her symptoms with fever being high and any worrisome new symptoms she should follow-up.  Patient did understand and agreed with plan    Bea Spears MD

## 2019-11-07 ENCOUNTER — MYC REFILL (OUTPATIENT)
Dept: FAMILY MEDICINE | Facility: CLINIC | Age: 24
End: 2019-11-07

## 2019-11-07 DIAGNOSIS — Z30.41 ENCOUNTER FOR BIRTH CONTROL PILLS MAINTENANCE: ICD-10-CM

## 2019-11-11 RX ORDER — DROSPIRENONE AND ETHINYL ESTRADIOL TABLETS 0.02-3(28)
1 KIT ORAL DAILY
Qty: 28 TABLET | Refills: 0 | Status: SHIPPED | OUTPATIENT
Start: 2019-11-11

## 2019-11-11 RX ORDER — DROSPIRENONE AND ETHINYL ESTRADIOL TABLETS 0.02-3(28)
1 KIT ORAL DAILY
Qty: 84 TABLET | Refills: 1 | OUTPATIENT
Start: 2019-11-11

## 2019-11-11 NOTE — TELEPHONE ENCOUNTER
[employer]ver [employer] message sent to patient.     Due for OV.     28 day refill was given in 2nd message for this patient.

## 2020-03-10 ENCOUNTER — HEALTH MAINTENANCE LETTER (OUTPATIENT)
Age: 25
End: 2020-03-10

## 2020-04-13 DIAGNOSIS — Z30.41 ENCOUNTER FOR BIRTH CONTROL PILLS MAINTENANCE: ICD-10-CM

## 2020-04-13 NOTE — TELEPHONE ENCOUNTER
"Requested Prescriptions   Pending Prescriptions Disp Refills     LORYNA 3-0.02 MG tablet 84 tablet 1     Sig: Take 1 tablet by mouth daily       Contraceptives Protocol Failed - 4/13/2020 11:19 AM        Failed - Recent (12 mo) or future (30 days) visit within the authorizing provider's specialty     Patient has had an office visit with the authorizing provider or a provider within the authorizing providers department within the previous 12 mos or has a future within next 30 days. See \"Patient Info\" tab in inbasket, or \"Choose Columns\" in Meds & Orders section of the refill encounter.              Passed - Patient is not a current smoker if age is 35 or older        Passed - Medication is active on med list        Passed - No active pregnancy on record        Passed - No positive pregnancy test in past 12 months           Routing refill request to provider for review/approval because:  Patient needs to be seen because it has been more than 1 year since last office visit.        "

## 2020-04-14 RX ORDER — DROSPIRENONE AND ETHINYL ESTRADIOL TABLETS 0.02-3(28)
1 KIT ORAL DAILY
Qty: 84 TABLET | Refills: 1 | Status: SHIPPED | OUTPATIENT
Start: 2020-04-14

## 2020-04-14 NOTE — TELEPHONE ENCOUNTER
6 month supply given   Patient should schedule preventive care visit and medication recheck post-YARELIS Goldstein PA-C

## 2020-07-28 ENCOUNTER — E-VISIT (OUTPATIENT)
Dept: FAMILY MEDICINE | Facility: CLINIC | Age: 25
End: 2020-07-28
Payer: COMMERCIAL

## 2020-07-28 DIAGNOSIS — Z53.9 ERRONEOUS ENCOUNTER--DISREGARD: Primary | ICD-10-CM

## 2020-07-29 ENCOUNTER — E-VISIT (OUTPATIENT)
Dept: INTERNAL MEDICINE | Facility: CLINIC | Age: 25
End: 2020-07-29

## 2020-07-29 ENCOUNTER — E-VISIT (OUTPATIENT)
Dept: FAMILY MEDICINE | Facility: CLINIC | Age: 25
End: 2020-07-29
Payer: COMMERCIAL

## 2020-07-29 DIAGNOSIS — H65.92 OME (OTITIS MEDIA WITH EFFUSION), LEFT: Primary | ICD-10-CM

## 2020-07-29 DIAGNOSIS — Z53.9 ERRONEOUS ENCOUNTER--DISREGARD: Primary | ICD-10-CM

## 2020-07-29 PROCEDURE — 99421 OL DIG E/M SVC 5-10 MIN: CPT | Performed by: FAMILY MEDICINE

## 2020-07-29 RX ORDER — AMOXICILLIN 875 MG
875 TABLET ORAL 2 TIMES DAILY
Qty: 20 TABLET | Refills: 0 | Status: SHIPPED | OUTPATIENT
Start: 2020-07-29 | End: 2020-08-08

## 2020-08-03 ENCOUNTER — MYC MEDICAL ADVICE (OUTPATIENT)
Dept: FAMILY MEDICINE | Facility: CLINIC | Age: 25
End: 2020-08-03

## 2020-08-03 DIAGNOSIS — H60.392 INFECTIVE OTITIS EXTERNA, LEFT: Primary | ICD-10-CM

## 2020-08-04 RX ORDER — NEOMYCIN SULFATE, POLYMYXIN B SULFATE AND HYDROCORTISONE 10; 3.5; 1 MG/ML; MG/ML; [USP'U]/ML
3 SUSPENSION/ DROPS AURICULAR (OTIC) 4 TIMES DAILY
Qty: 6 ML | Refills: 0 | Status: SHIPPED | OUTPATIENT
Start: 2020-08-04 | End: 2020-08-14

## 2020-08-04 NOTE — TELEPHONE ENCOUNTER
Have her stop the Amox.  I sent her abx ear drops.  If not any better, then probably needs to be seen

## 2020-09-06 ENCOUNTER — MYC MEDICAL ADVICE (OUTPATIENT)
Dept: FAMILY MEDICINE | Facility: CLINIC | Age: 25
End: 2020-09-06

## 2020-12-27 ENCOUNTER — HEALTH MAINTENANCE LETTER (OUTPATIENT)
Age: 25
End: 2020-12-27

## 2021-10-09 ENCOUNTER — HEALTH MAINTENANCE LETTER (OUTPATIENT)
Age: 26
End: 2021-10-09

## 2022-01-29 ENCOUNTER — HEALTH MAINTENANCE LETTER (OUTPATIENT)
Age: 27
End: 2022-01-29

## 2022-09-11 ENCOUNTER — HEALTH MAINTENANCE LETTER (OUTPATIENT)
Age: 27
End: 2022-09-11

## 2023-05-06 ENCOUNTER — HEALTH MAINTENANCE LETTER (OUTPATIENT)
Age: 28
End: 2023-05-06
